# Patient Record
Sex: FEMALE | Race: WHITE | NOT HISPANIC OR LATINO
[De-identification: names, ages, dates, MRNs, and addresses within clinical notes are randomized per-mention and may not be internally consistent; named-entity substitution may affect disease eponyms.]

---

## 2019-03-11 ENCOUNTER — RESULT REVIEW (OUTPATIENT)
Age: 59
End: 2019-03-11

## 2019-03-14 PROBLEM — Z00.00 ENCOUNTER FOR PREVENTIVE HEALTH EXAMINATION: Status: ACTIVE | Noted: 2019-03-14

## 2019-03-18 ENCOUNTER — APPOINTMENT (OUTPATIENT)
Dept: UROGYNECOLOGY | Facility: CLINIC | Age: 59
End: 2019-03-18
Payer: COMMERCIAL

## 2019-03-18 VITALS
SYSTOLIC BLOOD PRESSURE: 168 MMHG | WEIGHT: 210 LBS | DIASTOLIC BLOOD PRESSURE: 94 MMHG | BODY MASS INDEX: 37.21 KG/M2 | HEIGHT: 63 IN

## 2019-03-18 DIAGNOSIS — Z83.42 FAMILY HISTORY OF FAMILIAL HYPERCHOLESTEROLEMIA: ICD-10-CM

## 2019-03-18 DIAGNOSIS — Z82.49 FAMILY HISTORY OF ISCHEMIC HEART DISEASE AND OTHER DISEASES OF THE CIRCULATORY SYSTEM: ICD-10-CM

## 2019-03-18 DIAGNOSIS — Z78.9 OTHER SPECIFIED HEALTH STATUS: ICD-10-CM

## 2019-03-18 DIAGNOSIS — R39.198 OTHER DIFFICULTIES WITH MICTURITION: ICD-10-CM

## 2019-03-18 DIAGNOSIS — N36.41 HYPERMOBILITY OF URETHRA: ICD-10-CM

## 2019-03-18 LAB
BILIRUB UR QL STRIP: NORMAL
CLARITY UR: CLEAR
COLLECTION METHOD: NORMAL
GLUCOSE UR-MCNC: NORMAL
HCG UR QL: 0.2 EU/DL
HGB UR QL STRIP.AUTO: NORMAL
KETONES UR-MCNC: NORMAL
LEUKOCYTE ESTERASE UR QL STRIP: NORMAL
NITRITE UR QL STRIP: NORMAL
PH UR STRIP: 6
PROT UR STRIP-MCNC: NORMAL
SP GR UR STRIP: 1.01

## 2019-03-18 PROCEDURE — 81003 URINALYSIS AUTO W/O SCOPE: CPT | Mod: QW

## 2019-03-18 PROCEDURE — 99204 OFFICE O/P NEW MOD 45 MIN: CPT | Mod: 25

## 2019-03-18 PROCEDURE — 51701 INSERT BLADDER CATHETER: CPT

## 2019-03-18 RX ORDER — AMOXICILLIN 875 MG/1
875 TABLET, FILM COATED ORAL
Qty: 10 | Refills: 0 | Status: DISCONTINUED | COMMUNITY
Start: 2019-01-29

## 2019-03-18 RX ORDER — ACETAMINOPHEN AND CODEINE 300; 30 MG/1; MG/1
300-30 TABLET ORAL
Qty: 4 | Refills: 0 | Status: DISCONTINUED | COMMUNITY
Start: 2019-01-29

## 2019-03-18 RX ORDER — IBUPROFEN 600 MG/1
600 TABLET, FILM COATED ORAL
Qty: 20 | Refills: 0 | Status: DISCONTINUED | COMMUNITY
Start: 2019-01-29

## 2019-03-18 RX ORDER — ATORVASTATIN CALCIUM 20 MG/1
20 TABLET, FILM COATED ORAL
Qty: 30 | Refills: 0 | Status: DISCONTINUED | COMMUNITY
Start: 2019-01-10

## 2019-03-18 RX ORDER — CHLORHEXIDINE GLUCONATE, 0.12% ORAL RINSE 1.2 MG/ML
0.12 SOLUTION DENTAL
Qty: 480 | Refills: 0 | Status: DISCONTINUED | COMMUNITY
Start: 2019-01-29

## 2019-03-18 NOTE — PHYSICAL EXAM
[No Acute Distress] : in no acute distress [Well developed] : well developed [Well Nourished] : ~L well nourished [Oriented x3] : oriented to person, place, and time [Bulbocavernous] : bulbocavernous was present [Anal Reflex] : the anal reflex was present [Scar] : a scar was noted [Suprapubic] : in the suprapubic area [Warm and Dry] : was warm and dry to touch [Normal Gait] : gait was normal [Vulvar Atrophy] : vulvar atrophy [Normal Appearance] : general appearance was normal [Atrophy] : atrophy [Aa ____] : Aa [unfilled] [Cystocele] : a cystocele [Ba ____] : Ba [unfilled] [C ____] : C [unfilled] [GH ____] : GH [unfilled] [PB ____] : PB [unfilled] [TVL ____] : TVL  [unfilled] [Bp ____] : Bp [unfilled] [Ap ____] : Ap [unfilled] [Normal] : normal [Uterine Adnexae] : were not tender and not enlarged [Absent] : absent [Adnexa Absent] : absent bilaterally [Post Void Residual ____ml] : post void residual via catheterization was [unfilled] ml [Normal rectal exam] : was normal [Tenderness] : ~T no ~M abdominal tenderness observed [Distended] : not distended [H/Smegaly] : no hepatosplenomegaly [Inguinal LAD] : no adenopathy was noted in the inguinal lymph nodes [FreeTextEntry4] : + vv/cervical prolapse [FreeTextEntry3] : + hypermobbility

## 2019-03-18 NOTE — HISTORY OF PRESENT ILLNESS
[Vaginal Wall Prolapse] : none [Rectal Prolapse] : none [Unable To Restrain Bowel Movement] : none [Urinary Frequency] : none [Feelings Of Urinary Urgency] : none [Urinary Tract Infection] : none [] : days ago [Incomplete Emptying Of Stool] : none [FreeTextEntry3] : sometimes [de-identified] : 14 [de-identified] : 14 [de-identified] : f [de-identified] : 0-1 [de-identified] : sometimes [de-identified] : 14 [de-identified] : + sexually active [FreeTextEntry1] : ROS as per questionnaire.\par LIOR due to fibroids also ovaries\par

## 2019-04-03 ENCOUNTER — OUTPATIENT (OUTPATIENT)
Dept: OUTPATIENT SERVICES | Facility: HOSPITAL | Age: 59
LOS: 1 days | End: 2019-04-03

## 2019-04-03 ENCOUNTER — APPOINTMENT (OUTPATIENT)
Dept: UROGYNECOLOGY | Facility: CLINIC | Age: 59
End: 2019-04-03
Payer: SELF-PAY

## 2019-04-03 PROCEDURE — 99214 OFFICE O/P EST MOD 30 MIN: CPT

## 2019-04-03 PROCEDURE — A4562: CPT

## 2019-04-18 ENCOUNTER — APPOINTMENT (OUTPATIENT)
Dept: UROGYNECOLOGY | Facility: CLINIC | Age: 59
End: 2019-04-18
Payer: COMMERCIAL

## 2019-04-18 PROCEDURE — A4562: CPT

## 2019-04-18 PROCEDURE — 99213 OFFICE O/P EST LOW 20 MIN: CPT

## 2019-04-18 NOTE — HISTORY OF PRESENT ILLNESS
[FreeTextEntry1] : Pt presents to office for f/u of prolapse.  Fitted with ring with support #5 pessary at last visit.  Reports cramping and pressure with long standing and walking.

## 2019-04-18 NOTE — PROCEDURE
[Good Fit] : fits well [Refit] : refit needed [Pessary Inserted] : inserted [H2O] : H2O [None] : no bleeding [Erythema] : no erythema [Erosion] : no evidence of erosion [Discharge] : no vaginal discharge [Infection] : no evidence of infection [FreeTextEntry1] : ring with support #5 -->#4 [de-identified] : to ring with support #4 [de-identified] : but due to pt's symptoms of pressure and tightness, pessary size was decreased [FreeTextEntry8] : routine ricardo-care

## 2019-04-18 NOTE — DISCUSSION/SUMMARY
[FreeTextEntry1] : Pt reports comfort with ring with support #4.  She will RTO in 2 weeks or sooner if needed. Pt agrees to call office with any problems/concerns.

## 2019-04-18 NOTE — PHYSICAL EXAM
[No Acute Distress] : in no acute distress [Well developed] : well developed [Well Nourished] : ~L well nourished [Good Hygeine] : demonstrates good hygeine [Oriented x3] : oriented to person, place, and time [Normal Mood/Affect] : mood and affect are normal [Normal Memory] : ~T memory was ~L unimpaired [Warm and Dry] : was warm and dry to touch [Normal Gait] : gait was normal [Labia Majora] : were normal [Normal] : was normal [Labia Minora] : were normal [Normal Appearance] : general appearance was normal [No Bleeding] : there was no active vaginal bleeding [Atrophy] : atrophy [Anxiety] : patient is not anxious [Tenderness] : ~T no ~M abdominal tenderness observed [Distended] : not distended

## 2019-05-02 ENCOUNTER — APPOINTMENT (OUTPATIENT)
Dept: UROGYNECOLOGY | Facility: CLINIC | Age: 59
End: 2019-05-02
Payer: COMMERCIAL

## 2019-05-02 PROCEDURE — 99213 OFFICE O/P EST LOW 20 MIN: CPT

## 2019-05-02 NOTE — HISTORY OF PRESENT ILLNESS
[FreeTextEntry1] : Pt presents to office for f/ of prolapse.  Decreased ring with support  from 5 to 4 due to pelvic cramping and pressure. She reports cramping and pressure has resolved with the smaller pessary.  She did have to advance pessary twice in the last 2 weeks after bowel movement.  Denied constipation. Denies that this is bothersome to her.  She would like to continue with this pessary.

## 2019-05-02 NOTE — PROCEDURE
[Good Fit] : fits well [Pessary Washed] : washed [H2O] : H2O [None] : no bleeding [Refit] : refit is not needed [Erosion] : no evidence of erosion [Discharge] : no vaginal discharge [Infection] : no evidence of infection [Erythema] : no erythema [FreeTextEntry1] : RS #4 [FreeTextEntry8] : routine ricardo-care [de-identified] : no prolapse seen past pessary

## 2019-05-02 NOTE — PHYSICAL EXAM
[No Acute Distress] : in no acute distress [Well developed] : well developed [Oriented x3] : oriented to person, place, and time [Good Hygeine] : demonstrates good hygeine [Well Nourished] : ~L well nourished [Normal Mood/Affect] : mood and affect are normal [Normal Memory] : ~T memory was ~L unimpaired [Warm and Dry] : was warm and dry to touch [Normal Gait] : gait was normal [Labia Minora] : were normal [Labia Majora] : were normal [Normal] : was normal [Normal Appearance] : general appearance was normal [Atrophy] : atrophy [No Bleeding] : there was no active vaginal bleeding [Anxiety] : patient is not anxious [Tenderness] : ~T no ~M abdominal tenderness observed [Distended] : not distended

## 2019-05-02 NOTE — DISCUSSION/SUMMARY
[FreeTextEntry1] : Pt happy with pessary. she will RTO in 3 months for f/u of prolapse.  She RTO sooner if pessary falls out.  She agrees to call office with any problems/concerns.

## 2019-06-27 ENCOUNTER — EMERGENCY (EMERGENCY)
Facility: HOSPITAL | Age: 59
LOS: 1 days | Discharge: HOME | End: 2019-06-27
Attending: EMERGENCY MEDICINE
Payer: COMMERCIAL

## 2019-06-27 VITALS
DIASTOLIC BLOOD PRESSURE: 74 MMHG | HEART RATE: 69 BPM | HEIGHT: 62 IN | WEIGHT: 20.06 LBS | SYSTOLIC BLOOD PRESSURE: 140 MMHG | TEMPERATURE: 98 F | RESPIRATION RATE: 16 BRPM | OXYGEN SATURATION: 99 %

## 2019-06-27 PROCEDURE — 99283 EMERGENCY DEPT VISIT LOW MDM: CPT

## 2019-06-27 RX ORDER — CEPHALEXIN 500 MG
1 CAPSULE ORAL
Qty: 40 | Refills: 0
Start: 2019-06-27 | End: 2019-07-06

## 2019-06-27 RX ORDER — AZTREONAM 2 G
1 VIAL (EA) INJECTION
Qty: 20 | Refills: 0
Start: 2019-06-27 | End: 2019-07-06

## 2019-06-27 NOTE — ED ADULT NURSE NOTE - OBJECTIVE STATEMENT
Pt is a 63 yo female with the co right arm redness that is becoming progressively worse over the past two days. Pt denies any fevers or drainage no CP or SOB no N/V/D no cough or chills. Pt reports leaving for [place] for 1 month on Saturday and she wanted to get it looked at prior to her trip. Pt denies any other complaints at this time.

## 2019-06-27 NOTE — ED ADULT NURSE NOTE - NSIMPLEMENTINTERV_GEN_ALL_ED
Implemented All Universal Safety Interventions:  [place] to call system. Call bell, personal items and telephone within reach. Instruct patient to call for assistance. Room bathroom lighting operational. Non-slip footwear when patient is off stretcher. Physically safe environment: no spills, clutter or unnecessary equipment. Stretcher in lowest position, wheels locked, appropriate side rails in place.

## 2019-06-27 NOTE — ED PROVIDER NOTE - CLINICAL SUMMARY MEDICAL DECISION MAKING FREE TEXT BOX
faint erythema and warmth at the area of a prior sebaceous cyst but no palpable abscess on exam, no exam findings suggestive of systemic bacterial infection or fasciitis. Will start treatment for cellulitis, plastics follow up, return precautions given. [place]: faint erythema and warmth at the area of a likely sebaceous cyst but no palpable abscess or tenderness on exam, no exam findings suggestive of systemic bacterial infection or fasciitis. Will start treatment for cellulitis, plastics follow up, return precautions given.

## 2019-06-27 NOTE — ED PROVIDER NOTE - SKIN, MLM
Skin normal color for race, warm, dry and intact. 2x3cm area of induration to the lateral right upper arm with faint surrounding erythema, no fluctuance, no tenderness, no discharge or open wound.

## 2019-06-27 NOTE — ED PROVIDER NOTE - OBJECTIVE STATEMENT
58yof w/ HTN has had a sebaceous cyst on the right upper arm for several years, noticed some increasing swelling for the past 2-3 days and this morning noticed mild redness and warmth. No fevers, chills or other systemic symptoms. No arm pain. No trauma or inciting event. No discharge.

## 2019-06-27 NOTE — ED PROVIDER NOTE - CARE PROVIDER_API CALL
Chilo Jerez (MD)  Plastic Surgery  [address] [place] [address], [address]  [place], [place] [id number]  Phone: [phone]  Fax: [phone]  Follow Up Time:

## 2019-07-07 ENCOUNTER — TRANSCRIPTION ENCOUNTER (OUTPATIENT)
Age: 59
End: 2019-07-07

## 2019-08-05 ENCOUNTER — APPOINTMENT (OUTPATIENT)
Dept: UROGYNECOLOGY | Facility: CLINIC | Age: 59
End: 2019-08-05
Payer: COMMERCIAL

## 2019-08-05 PROBLEM — I10 ESSENTIAL (PRIMARY) HYPERTENSION: Chronic | Status: ACTIVE | Noted: 2019-06-27

## 2019-08-05 PROBLEM — E78.5 HYPERLIPIDEMIA, UNSPECIFIED: Chronic | Status: ACTIVE | Noted: 2019-06-27

## 2019-08-05 PROCEDURE — 99213 OFFICE O/P EST LOW 20 MIN: CPT

## 2019-08-05 NOTE — DISCUSSION/SUMMARY
[FreeTextEntry1] : Pt happy with pessary. She will RTO in 3 months or sooner if needed. Pt agrees to call office with any problems/concerns.

## 2019-08-05 NOTE — PROCEDURE
[Good Fit] : fits well [Pessary Washed] : washed [H2O] : H2O [None] : no bleeding [Refit] : refit is not needed [Erosion] : no evidence of erosion [Erythema] : no erythema [Discharge] : no vaginal discharge [FreeTextEntry1] : Ring with support #4 [Infection] : no evidence of infection [FreeTextEntry8] : routine ricardo-care

## 2019-10-04 DIAGNOSIS — Z01.818 ENCOUNTER FOR OTHER PREPROCEDURAL EXAMINATION: ICD-10-CM

## 2019-11-06 ENCOUNTER — APPOINTMENT (OUTPATIENT)
Dept: UROGYNECOLOGY | Facility: CLINIC | Age: 59
End: 2019-11-06
Payer: COMMERCIAL

## 2019-11-06 PROCEDURE — 99213 OFFICE O/P EST LOW 20 MIN: CPT

## 2019-11-06 NOTE — HISTORY OF PRESENT ILLNESS
[FreeTextEntry1] : Pt happy with pessary.  Reports good support.  Denies any pelvic pain, pressure or vaginal bleeding.  Denies any problems with urination or BM.

## 2019-11-06 NOTE — DISCUSSION/SUMMARY
[FreeTextEntry1] : Pt to f/u in 3 months or sooner if needed.  States she is due for visit with GYN.  Discussed that she does still have her cervix and she should have PAP done.  She will f/u here 3 months after visit with GYN.  Instructed to call with any questions or concerns and she verbalizes understanding.\par

## 2019-11-06 NOTE — PHYSICAL EXAM
[No Acute Distress] : in no acute distress [Well developed] : well developed [Well Nourished] : ~L well nourished [Good Hygeine] : demonstrates good hygeine [Oriented x3] : oriented to person, place, and time [Normal Memory] : ~T memory was ~L unimpaired [Normal Mood/Affect] : mood and affect are normal [Soft, Nontender] : the abdomen was soft and nontender [Warm and Dry] : was warm and dry to touch [Normal Gait] : gait was normal [Normal Appearance] : general appearance was normal [Cystocele] : a cystocele [Discharge] : a  ~M vaginal discharge was present [Scant] : scant [Ciera] : yellow [No Bleeding] : there was no active vaginal bleeding [Normal] : normal

## 2019-11-06 NOTE — PROCEDURE
[Good Fit] : fits well [Discharge] : there is vaginal discharge [Pessary Inserted] : inserted [Pessary Washed] : washed [H2O] : H2O [None] : no bleeding [Erosion] : no evidence of erosion [Erythema] : no erythema [Infection] : no evidence of infection [FreeTextEntry1] : RS #4

## 2020-02-03 ENCOUNTER — APPOINTMENT (OUTPATIENT)
Dept: UROGYNECOLOGY | Facility: CLINIC | Age: 60
End: 2020-02-03
Payer: COMMERCIAL

## 2020-02-03 PROCEDURE — 99213 OFFICE O/P EST LOW 20 MIN: CPT

## 2020-02-03 NOTE — PHYSICAL EXAM
[No Acute Distress] : in no acute distress [Well developed] : well developed [Well Nourished] : ~L well nourished [Good Hygeine] : demonstrates good hygeine [Oriented x3] : oriented to person, place, and time [Normal Memory] : ~T memory was ~L unimpaired [Normal Mood/Affect] : mood and affect are normal [Soft, Nontender] : the abdomen was soft and nontender [Warm and Dry] : was warm and dry to touch [Normal Gait] : gait was normal [Normal Appearance] : general appearance was normal [Atrophy] : atrophy [Cystocele] : a cystocele [Discharge] : a  ~M vaginal discharge was present [Scant] : scant [White] : white [No Bleeding] : there was no active vaginal bleeding [Normal] : normal

## 2020-02-03 NOTE — HISTORY OF PRESENT ILLNESS
[FreeTextEntry1] : Pt very happy with pessary.  Reports good support.  Denies any pelvic pain, pressure or vaginal bleeding.  Denies any problems with urination or BM.  No other changes since last visit.

## 2020-06-01 ENCOUNTER — APPOINTMENT (OUTPATIENT)
Dept: UROGYNECOLOGY | Facility: CLINIC | Age: 60
End: 2020-06-01
Payer: COMMERCIAL

## 2020-06-01 PROCEDURE — 99213 OFFICE O/P EST LOW 20 MIN: CPT

## 2020-06-01 NOTE — DISCUSSION/SUMMARY
[FreeTextEntry1] : F/u 3 months or sooner if needed.  Discussed using replens/luvena 3x/week at night.  Instructed to call with any questions or concerns and she verbalizes understanding.\par

## 2020-06-01 NOTE — HISTORY OF PRESENT ILLNESS
[FreeTextEntry1] : Pt happy with pessary.  She denies any pelvic pain, pressure or vaginal bleeding.  Denies any problems with urination or BM.  She is happy with pessary but considering surgical correction. Denies any other changes since last visit.

## 2020-06-01 NOTE — PROCEDURE
[Good Fit] : fits well [Discharge] : there is vaginal discharge [Erythema] : erythema noted [Pessary Washed] : washed [Pessary Inserted] : inserted [Mild] : mild bleeding [H2O] : H2O [Resolved w/pressure] : was resolved by applying pressure [Erosion] : no evidence of erosion [Infection] : no evidence of infection [FreeTextEntry1] : RS #4

## 2020-06-01 NOTE — PHYSICAL EXAM
[No Acute Distress] : in no acute distress [Well developed] : well developed [Well Nourished] : ~L well nourished [Good Hygeine] : demonstrates good hygeine [Oriented x3] : oriented to person, place, and time [Normal Memory] : ~T memory was ~L unimpaired [Normal Mood/Affect] : mood and affect are normal [Soft, Nontender] : the abdomen was soft and nontender [Warm and Dry] : was warm and dry to touch [Normal Gait] : gait was normal [Atrophy] : atrophy [Cystocele] : a cystocele [Normal Appearance] : general appearance was normal [Discharge] : a  ~M vaginal discharge was present [Ciera] : yellow [Scant] : there was scant vaginal bleeding [Normal] : normal [FreeTextEntry4] : + irritations to posterior vaginal wall with scant bleeding

## 2020-07-17 NOTE — ED PROVIDER NOTE - NSFOLLOWUPINSTRUCTIONS_ED_ALL_ED_FT
Spoke with patient. Patient did not need medication. Was called in twice.    1. Continue cephalexin 500mg 4 times daily for 10 days  2. Continue Bactrim DS twice daily for 10 days  3. Take probiotic or yogurt with live-active culture while on antibiotics.   4. Follow up with plastic surgery for cyst removal  5. Return to the ER for any new or worsening symptoms.

## 2020-09-14 ENCOUNTER — APPOINTMENT (OUTPATIENT)
Dept: UROGYNECOLOGY | Facility: CLINIC | Age: 60
End: 2020-09-14
Payer: COMMERCIAL

## 2020-09-14 PROCEDURE — 99213 OFFICE O/P EST LOW 20 MIN: CPT

## 2020-09-14 NOTE — HISTORY OF PRESENT ILLNESS
[FreeTextEntry1] : Pt happy with pessary.  Reports good support.  Denies any pelvic pain, pressure or vaginal bleeding.  Denies any problems with urination or BM.  She started using replens TIW.

## 2020-09-14 NOTE — DISCUSSION/SUMMARY
[FreeTextEntry1] : F/u 3 months or sooner if needed.  Desirae replens TIW.  Instructed to call with any questions or concerns and she verbalizes understanding.\par

## 2020-09-14 NOTE — PHYSICAL EXAM
[No Acute Distress] : in no acute distress [Well Nourished] : ~L well nourished [Well developed] : well developed [Good Hygeine] : demonstrates good hygeine [Normal Memory] : ~T memory was ~L unimpaired [Oriented x3] : oriented to person, place, and time [Normal Mood/Affect] : mood and affect are normal [Soft, Nontender] : the abdomen was soft and nontender [Normal Gait] : gait was normal [Warm and Dry] : was warm and dry to touch [Atrophy] : atrophy [Normal Appearance] : general appearance was normal [Cystocele] : a cystocele [Discharge] : a  ~M vaginal discharge was present [Scant] : scant [White] : white [No Bleeding] : there was no active vaginal bleeding [Normal] : normal

## 2020-09-14 NOTE — PROCEDURE
[Good Fit] : fits well [Discharge] : there is vaginal discharge [Pessary Inserted] : inserted [Pessary Washed] : washed [H2O] : H2O [None] : no bleeding [Erythema] : no erythema [Erosion] : no evidence of erosion [Infection] : no evidence of infection [FreeTextEntry1] : RS #4

## 2020-12-15 ENCOUNTER — APPOINTMENT (OUTPATIENT)
Dept: UROGYNECOLOGY | Facility: CLINIC | Age: 60
End: 2020-12-15
Payer: COMMERCIAL

## 2020-12-15 VITALS — SYSTOLIC BLOOD PRESSURE: 130 MMHG | DIASTOLIC BLOOD PRESSURE: 88 MMHG | TEMPERATURE: 97.3 F

## 2020-12-15 PROCEDURE — 99213 OFFICE O/P EST LOW 20 MIN: CPT

## 2020-12-15 PROCEDURE — 99072 ADDL SUPL MATRL&STAF TM PHE: CPT

## 2020-12-15 NOTE — DISCUSSION/SUMMARY
[FreeTextEntry1] : Patient to follow up in 3 months or sooner if needed\par Continue with replens TIW\par Advised to use A&D ointment all around vagina to help with itching\par Patient agrees to call office with questions or concerns, patient verbalized understanding\par

## 2020-12-15 NOTE — HISTORY OF PRESENT ILLNESS
[FreeTextEntry1] : Patient presents for routine pessary maintenance. Patient states she is happy with the pessary. Denies urinary or bowel complaints. States she continues to use replens three times a week. Denies pelvic pain, pelvic pressure or vaginal bleeding. Patient added she has been feeling like her vagina is itchy. \par

## 2020-12-15 NOTE — PROCEDURE
[Good Fit] : fits well [Refit] : refit is not needed [Erosion] : no evidence of erosion [Erythema] : no erythema [Discharge] : there is vaginal discharge [Infection] : no evidence of infection [Pessary Inserted] : inserted [Pessary Washed] : washed [H2O] : H2O [None] : no bleeding [FreeTextEntry1] : RS #4 [de-identified] : mild white leukorrhea  [FreeTextEntry8] : maintain ricardo-care

## 2021-03-30 ENCOUNTER — APPOINTMENT (OUTPATIENT)
Dept: UROGYNECOLOGY | Facility: CLINIC | Age: 61
End: 2021-03-30
Payer: COMMERCIAL

## 2021-03-30 VITALS — BODY MASS INDEX: 34.55 KG/M2 | TEMPERATURE: 96.9 F | HEIGHT: 63 IN | WEIGHT: 195 LBS

## 2021-03-30 PROCEDURE — 99072 ADDL SUPL MATRL&STAF TM PHE: CPT

## 2021-03-30 PROCEDURE — 99213 OFFICE O/P EST LOW 20 MIN: CPT

## 2021-03-30 NOTE — PROCEDURE
[Good Fit] : fits well [Discharge] : there is vaginal discharge [Pessary Inserted] : inserted [Pessary Washed] : washed [H2O] : H2O [None] : no bleeding [Refit] : refit is not needed [Erosion] : no evidence of erosion [Erythema] : no erythema [Infection] : no evidence of infection [FreeTextEntry1] : RS #4 [FreeTextEntry8] : maintain ricardo-care [de-identified] : mild white leukorrhea

## 2021-03-30 NOTE — HISTORY OF PRESENT ILLNESS
[FreeTextEntry1] : Patient presents for routine pessary maintenance. Patient states she is happy with the pessary. Denies urinary or bowel complaints.Denies pelvic pain, pelvic pressure or vaginal bleeding. She is using Replens.

## 2021-06-23 ENCOUNTER — APPOINTMENT (OUTPATIENT)
Dept: UROGYNECOLOGY | Facility: CLINIC | Age: 61
End: 2021-06-23
Payer: COMMERCIAL

## 2021-06-23 PROCEDURE — 99213 OFFICE O/P EST LOW 20 MIN: CPT

## 2021-06-23 NOTE — PROCEDURE
[Good Fit] : fits well [Discharge] : there is vaginal discharge [Pessary Inserted] : inserted [Pessary Washed] : washed [H2O] : H2O [None] : no bleeding [Refit] : refit is not needed [Erosion] : no evidence of erosion [Erythema] : no erythema [Infection] : no evidence of infection [FreeTextEntry1] : Ring with support # 4. [de-identified] : mild white leukorrhea  [FreeTextEntry8] : maintain ricardo-care

## 2021-06-23 NOTE — PHYSICAL EXAM
[No Acute Distress] : in no acute distress [Well developed] : well developed [Well Nourished] : ~L well nourished [Good Hygeine] : demonstrates good hygeine [Oriented x3] : oriented to person, place, and time [Normal Mood/Affect] : mood and affect are normal [Normal Memory] : ~T memory was ~L unimpaired [Soft, Nontender] : the abdomen was soft and nontender [Warm and Dry] : was warm and dry to touch [Normal Gait] : gait was normal [Normal Appearance] : general appearance was normal [Atrophy] : atrophy [Cystocele] : a cystocele [Discharge] : a  ~M vaginal discharge was present [Scant] : scant [White] : white [No Bleeding] : there was no active vaginal bleeding [Normal] : normal

## 2021-06-23 NOTE — DISCUSSION/SUMMARY
[FreeTextEntry1] : Patient to follow up in 3 months or sooner if needed for pelvic prolapse.\par Continue with replens TIW\par Patient agrees to call office with questions or concerns, patient verbalized understanding\par

## 2021-09-28 ENCOUNTER — APPOINTMENT (OUTPATIENT)
Dept: UROGYNECOLOGY | Facility: CLINIC | Age: 61
End: 2021-09-28
Payer: COMMERCIAL

## 2021-09-28 PROCEDURE — 99213 OFFICE O/P EST LOW 20 MIN: CPT

## 2021-09-28 NOTE — PROCEDURE
[Good Fit] : fits well [Discharge] : there is vaginal discharge [Pessary Inserted] : inserted [Pessary Washed] : washed [H2O] : H2O [None] : no bleeding [Refit] : refit is not needed [Erosion] : no evidence of erosion [Erythema] : no erythema [Infection] : no evidence of infection [FreeTextEntry1] : Ring with support # 4. [de-identified] : mild white leukorrhea  [FreeTextEntry8] : maintain ricardo-care

## 2021-12-17 ENCOUNTER — APPOINTMENT (OUTPATIENT)
Dept: UROGYNECOLOGY | Facility: CLINIC | Age: 61
End: 2021-12-17
Payer: COMMERCIAL

## 2021-12-17 VITALS — TEMPERATURE: 97.3 F

## 2021-12-17 PROCEDURE — 99213 OFFICE O/P EST LOW 20 MIN: CPT

## 2021-12-17 NOTE — PHYSICAL EXAM
[No Acute Distress] : in no acute distress [Well developed] : well developed [Well Nourished] : ~L well nourished [Good Hygeine] : demonstrates good hygeine [Oriented x3] : oriented to person, place, and time [Normal Memory] : ~T memory was ~L unimpaired [Normal Mood/Affect] : mood and affect are normal [Soft, Nontender] : the abdomen was soft and nontender [Warm and Dry] : was warm and dry to touch [Normal Gait] : gait was normal [Normal Appearance] : general appearance was normal [Atrophy] : atrophy [Cystocele] : a cystocele [Discharge] : a  ~M vaginal discharge was present [Scant] : scant [White] : white [No Bleeding] : there was no active vaginal bleeding [Normal] : normal [Vulvar Atrophy] : vulvar atrophy [Labia Majora] : were normal [Labia Minora] : were normal

## 2021-12-17 NOTE — PROCEDURE
[Good Fit] : fits well [Discharge] : there is vaginal discharge [Pessary Inserted] : inserted [Pessary Washed] : washed [H2O] : H2O [None] : no bleeding [Refit] : refit is not needed [Erosion] : no evidence of erosion [Erythema] : no erythema [Infection] : no evidence of infection [FreeTextEntry1] : Ring with support # 4. [de-identified] : mild white leukorrhea  [FreeTextEntry8] : maintain ricardo-care

## 2021-12-17 NOTE — DISCUSSION/SUMMARY
[FreeTextEntry1] : 1. POP\par - Patient doing well with pessary\par - Pessary precautions and instructions reviewed\par - Advised to continue use of vaginal replens\par - Will RTO in 3 months for follow up or sooner if needed\par \par Patient agrees to call office with questions or concerns, patient verbalized understanding\par

## 2022-03-04 ENCOUNTER — APPOINTMENT (OUTPATIENT)
Dept: UROGYNECOLOGY | Facility: CLINIC | Age: 62
End: 2022-03-04
Payer: COMMERCIAL

## 2022-03-04 PROCEDURE — 99213 OFFICE O/P EST LOW 20 MIN: CPT

## 2022-03-04 NOTE — PHYSICAL EXAM
[No Acute Distress] : in no acute distress [Well developed] : well developed [Well Nourished] : ~L well nourished [Good Hygeine] : demonstrates good hygeine [Oriented x3] : oriented to person, place, and time [Normal Memory] : ~T memory was ~L unimpaired [Normal Mood/Affect] : mood and affect are normal [Soft, Nontender] : the abdomen was soft and nontender [Warm and Dry] : was warm and dry to touch [Normal Gait] : gait was normal [Vulvar Atrophy] : vulvar atrophy [Labia Majora] : were normal [Labia Minora] : were normal [Normal Appearance] : general appearance was normal [Atrophy] : atrophy [Cystocele] : a cystocele [Discharge] : a  ~M vaginal discharge was present [Scant] : scant [White] : white [No Bleeding] : there was no active vaginal bleeding [Normal] : normal

## 2022-03-08 NOTE — PROCEDURE
[Good Fit] : fits well [Discharge] : there is vaginal discharge [Pessary Inserted] : inserted [Pessary Washed] : washed [H2O] : H2O [None] : no bleeding [Refit] : refit is not needed [Erosion] : no evidence of erosion [Erythema] : no erythema [Infection] : no evidence of infection [FreeTextEntry1] : Ring with support # 4. [de-identified] : mild white leukorrhea  [FreeTextEntry8] : maintain ricardo-care

## 2022-03-08 NOTE — DISCUSSION/SUMMARY
[FreeTextEntry1] : 1. Pelvic prolapse:\par - Continue with Ring with support #4.  Patient doing well with pessary\par - Pessary precautions and instructions reviewed\par - Advised to continue use of vaginal replens\par \par - Will RTO in 3 months for follow up or sooner if needed\par Patient agrees to call office with questions or concerns, patient verbalized understanding\par

## 2022-03-08 NOTE — HISTORY OF PRESENT ILLNESS
[FreeTextEntry1] : Jory, 62y/o presents to the office for follow up pelvic prolapse, supported with ring # 4. Patient states she is happy with the pessary. Denies urinary or bowel complaints. Denies pelvic pain, pelvic pressure or vaginal bleeding. She is using Replens.

## 2022-05-27 ENCOUNTER — APPOINTMENT (OUTPATIENT)
Dept: UROGYNECOLOGY | Facility: CLINIC | Age: 62
End: 2022-05-27
Payer: COMMERCIAL

## 2022-05-27 DIAGNOSIS — N89.8 OTHER SPECIFIED NONINFLAMMATORY DISORDERS OF VAGINA: ICD-10-CM

## 2022-05-27 PROCEDURE — 99213 OFFICE O/P EST LOW 20 MIN: CPT

## 2022-05-27 NOTE — PROCEDURE
[Good Fit] : fits well [Discharge] : there is vaginal discharge [Pessary Inserted] : inserted [Pessary Washed] : washed [H2O] : H2O [None] : no bleeding [Refit] : refit is not needed [Erosion] : no evidence of erosion [Erythema] : no erythema [Infection] : no evidence of infection [FreeTextEntry1] : Ring with support # 4. [de-identified] : mild white leukorrhea  [FreeTextEntry8] : maintain ricardo-care

## 2022-08-26 ENCOUNTER — APPOINTMENT (OUTPATIENT)
Dept: UROGYNECOLOGY | Facility: CLINIC | Age: 62
End: 2022-08-26

## 2022-08-26 PROCEDURE — 99213 OFFICE O/P EST LOW 20 MIN: CPT

## 2022-08-26 NOTE — PROCEDURE
[Good Fit] : fits well [Pessary Inserted] : inserted [Pessary Washed] : washed [None] : no bleeding [Medication Review] : Medicaiton Review: Patient verbalizes understanding of risks and benefits [Refit] : refit is not needed [Erosion] : no evidence of erosion [Erythema] : no erythema [Discharge] : no vaginal discharge [Infection] : no evidence of infection [FreeTextEntry1] : Ring with support # 4. [FreeTextEntry3] : Replens [FreeTextEntry8] : Reviewed ricardo-care

## 2022-08-26 NOTE — HISTORY OF PRESENT ILLNESS
[FreeTextEntry1] : Jory, 60y/o presents to the office for follow up for her pelvic prolapse, supported with ring with support # 4. Patient states she is happy with the pessary. Denies urinary or bowel complaints. Denies pelvic pain, pelvic pressure or vaginal bleeding. She is using Replens.

## 2022-08-26 NOTE — PHYSICAL EXAM
[No Acute Distress] : in no acute distress [Well developed] : well developed [Well Nourished] : ~L well nourished [Good Hygeine] : demonstrates good hygeine [Oriented x3] : oriented to person, place, and time [Normal Memory] : ~T memory was ~L unimpaired [Normal Mood/Affect] : mood and affect are normal [Soft, Nontender] : the abdomen was soft and nontender [Warm and Dry] : was warm and dry to touch [Normal Gait] : gait was normal [Vulvar Atrophy] : vulvar atrophy [Labia Majora] : were normal [Labia Minora] : were normal [Normal Appearance] : general appearance was normal [Atrophy] : atrophy [Cystocele] : a cystocele [No Bleeding] : there was no active vaginal bleeding [Normal] : normal [Anxiety] : patient is not anxious

## 2022-09-20 ENCOUNTER — APPOINTMENT (OUTPATIENT)
Dept: OBGYN | Facility: CLINIC | Age: 62
End: 2022-09-20

## 2022-09-20 VITALS
BODY MASS INDEX: 40.04 KG/M2 | SYSTOLIC BLOOD PRESSURE: 143 MMHG | DIASTOLIC BLOOD PRESSURE: 83 MMHG | HEIGHT: 63 IN | WEIGHT: 226 LBS

## 2022-09-20 PROCEDURE — 99386 PREV VISIT NEW AGE 40-64: CPT

## 2022-09-20 NOTE — HISTORY OF PRESENT ILLNESS
[Currently Active] : currently active [Mammogramdate] : 8/22 [PapSmeardate] : 3/19 [BoneDensityDate] : 2022 [ColonoscopyDate] : >10YRS AGO;  COLOGARD DONE RECENTLY [PGHxTotal] : 2 [[facility] : [facility] [[facility] : 2 [FreeTextEntry1] : YELITZA

## 2022-09-20 NOTE — PHYSICAL EXAM
clnical update provided to laurel at [facility], approved additional week with update due 11/14  Met w/pt and reviewed team update and insurance info  Pt feels he is doing very well and is appreciative of the therapy  Pt is aware of goals for dc and need for update next week  Following to assist w/contd stay review and dc needs  [Appropriately responsive] : appropriately responsive [Alert] : alert [No Acute Distress] : no acute distress [No Lymphadenopathy] : no lymphadenopathy [Soft] : soft [Non-tender] : non-tender [Non-distended] : non-distended [No HSM] : No HSM [No Lesions] : no lesions [No Mass] : no mass [Oriented x3] : oriented x3 [Examination Of The Breasts] : a normal appearance [No Masses] : no breast masses were palpable [Labia Majora] : normal [Labia Minora] : normal [Cystocele] : a cystocele [Normal] : normal [Absent] : absent [Uterine Adnexae] : absent

## 2022-09-22 LAB — HPV HIGH+LOW RISK DNA PNL CVX: NOT DETECTED

## 2022-09-27 LAB — CYTOLOGY CVX/VAG DOC THIN PREP: NORMAL

## 2022-12-01 ENCOUNTER — NON-APPOINTMENT (OUTPATIENT)
Age: 62
End: 2022-12-01

## 2022-12-01 ENCOUNTER — APPOINTMENT (OUTPATIENT)
Dept: UROGYNECOLOGY | Facility: CLINIC | Age: 62
End: 2022-12-01

## 2022-12-01 VITALS
BODY MASS INDEX: 38.09 KG/M2 | HEART RATE: 71 BPM | SYSTOLIC BLOOD PRESSURE: 143 MMHG | DIASTOLIC BLOOD PRESSURE: 88 MMHG | HEIGHT: 63 IN | WEIGHT: 215 LBS | OXYGEN SATURATION: 99 %

## 2022-12-01 PROCEDURE — 99213 OFFICE O/P EST LOW 20 MIN: CPT

## 2022-12-01 NOTE — DISCUSSION/SUMMARY
[FreeTextEntry1] : Pelvic prolapse:\par - Continue with Ring with support #4.  Patient doing well with pessary\par - Pessary precautions and instructions reviewed\par - Advised to continue use of vaginal replens\par \par - Will RTO in 3 months for follow up or sooner if needed\par Patient agrees to call office with questions or concerns, patient verbalized understanding\par

## 2022-12-01 NOTE — PHYSICAL EXAM
[No Acute Distress] : in no acute distress [Well developed] : well developed [Well Nourished] : ~L well nourished [Good Hygeine] : demonstrates good hygeine [Oriented x3] : oriented to person, place, and time [Normal Memory] : ~T memory was ~L unimpaired [Normal Mood/Affect] : mood and affect are normal [Anxiety] : patient is not anxious [Soft, Nontender] : the abdomen was soft and nontender [Warm and Dry] : was warm and dry to touch [Normal Gait] : gait was normal [Vulvar Atrophy] : vulvar atrophy [Labia Majora] : were normal [Labia Minora] : were normal [Normal Appearance] : general appearance was normal [Atrophy] : atrophy [Cystocele] : a cystocele [No Bleeding] : there was no active vaginal bleeding [Normal] : normal

## 2022-12-01 NOTE — PROCEDURE
[Good Fit] : fits well [Refit] : refit is not needed [Erosion] : no evidence of erosion [Erythema] : no erythema [Discharge] : no vaginal discharge [Infection] : no evidence of infection [Pessary Inserted] : inserted [Pessary Washed] : washed [None] : no bleeding [Medication Review] : Medicaiton Review: Patient verbalizes understanding of risks and benefits [FreeTextEntry1] : Ring with support # 4. [FreeTextEntry3] : Replens [FreeTextEntry8] : Reviewed [facility]

## 2022-12-01 NOTE — HISTORY OF PRESENT ILLNESS
[FreeTextEntry1] : Jory, 61 y/o presents to the office for follow up for her pelvic prolapse, supported with ring with support # 4. Patient states she is happy with the pessary. Denies urinary or bowel complaints. Denies pelvic pain, pelvic pressure or vaginal bleeding. She is using Replens.

## 2023-03-02 ENCOUNTER — APPOINTMENT (OUTPATIENT)
Dept: UROGYNECOLOGY | Facility: CLINIC | Age: 63
End: 2023-03-02
Payer: COMMERCIAL

## 2023-03-02 VITALS
HEIGHT: 62 IN | SYSTOLIC BLOOD PRESSURE: 142 MMHG | OXYGEN SATURATION: 100 % | WEIGHT: 217 LBS | HEART RATE: 67 BPM | BODY MASS INDEX: 39.93 KG/M2 | DIASTOLIC BLOOD PRESSURE: 87 MMHG

## 2023-03-02 PROCEDURE — 99213 OFFICE O/P EST LOW 20 MIN: CPT

## 2023-04-07 NOTE — REASON FOR VISIT
Insurance will not cover CPAP titration   They recommend APAP  Okay to place order for APAP for patient or pulmonary consult for patient?   If okay to order would need minimum/maximum cmH20 settings   [Follow-Up Visit_____] : a follow-up visit for [unfilled]

## 2023-06-01 ENCOUNTER — APPOINTMENT (OUTPATIENT)
Dept: UROGYNECOLOGY | Facility: CLINIC | Age: 63
End: 2023-06-01
Payer: COMMERCIAL

## 2023-06-01 VITALS
OXYGEN SATURATION: 99 % | HEART RATE: 62 BPM | HEIGHT: 62 IN | SYSTOLIC BLOOD PRESSURE: 114 MMHG | DIASTOLIC BLOOD PRESSURE: 73 MMHG | WEIGHT: 215 LBS | BODY MASS INDEX: 39.56 KG/M2

## 2023-06-01 DIAGNOSIS — N81.2 INCOMPLETE UTEROVAGINAL PROLAPSE: ICD-10-CM

## 2023-06-01 PROCEDURE — 99213 OFFICE O/P EST LOW 20 MIN: CPT

## 2023-06-01 NOTE — REVIEW OF SYSTEMS
Pt called with questions about her scheduled C/S. Confirmed date of 05-06-20 at 1300. Pt instructed time is approximate. Pt had questions about Covid testing and hospital guidelines. Transferred to [facility] HUGHES to answer patient's questions.   [All Other ROS] : all other reviewed systems are negative

## 2023-06-01 NOTE — PROCEDURE
[Good Fit] : fits well [Refit] : refit is not needed [Erosion] : no evidence of erosion [Erythema] : no erythema [Discharge] : there is vaginal discharge [Infection] : no evidence of infection [Pessary Inserted] : inserted [Pessary Washed] : washed [None] : no bleeding [Medication Review] : Medicaiton Review: Patient verbalizes understanding of risks and benefits [FreeTextEntry1] : Ring with support # 4. [FreeTextEntry3] : Replens [FreeTextEntry8] : Reviewed [facility]

## 2023-06-08 NOTE — REVIEW OF SYSTEMS
Shingles Vaccine(1 of 2) Never done- declined COVID-19 Vaccine(3 - Moderna series) due on 08/20/2021- declined TETANUS VACCINE due on 10/11/2021-declined
[All Other ROS] : all other reviewed systems are negative

## 2023-08-31 ENCOUNTER — APPOINTMENT (OUTPATIENT)
Dept: UROGYNECOLOGY | Facility: CLINIC | Age: 63
End: 2023-08-31
Payer: COMMERCIAL

## 2023-08-31 VITALS
HEIGHT: 62 IN | BODY MASS INDEX: 39.56 KG/M2 | DIASTOLIC BLOOD PRESSURE: 70 MMHG | SYSTOLIC BLOOD PRESSURE: 116 MMHG | WEIGHT: 215 LBS

## 2023-08-31 PROCEDURE — 99213 OFFICE O/P EST LOW 20 MIN: CPT

## 2023-08-31 NOTE — HISTORY OF PRESENT ILLNESS
[FreeTextEntry1] : Jory, 63 y/o presents to the office for follow up for her pelvic prolapse, supported with ring with support # 4. Patient states she is happy with the pessary. Denies urinary or bowel complaints. Denies pelvic pain, pelvic pressure or vaginal bleeding. She is using Replens.
22:05

## 2023-09-21 ENCOUNTER — APPOINTMENT (OUTPATIENT)
Dept: OBGYN | Facility: CLINIC | Age: 63
End: 2023-09-21
Payer: COMMERCIAL

## 2023-09-21 VITALS
DIASTOLIC BLOOD PRESSURE: 81 MMHG | BODY MASS INDEX: 39.56 KG/M2 | SYSTOLIC BLOOD PRESSURE: 148 MMHG | HEIGHT: 62 IN | WEIGHT: 215 LBS

## 2023-09-21 DIAGNOSIS — B37.9 CANDIDIASIS, UNSPECIFIED: ICD-10-CM

## 2023-09-21 DIAGNOSIS — Z01.419 ENCOUNTER FOR GYNECOLOGICAL EXAMINATION (GENERAL) (ROUTINE) W/OUT ABNORMAL FINDINGS: ICD-10-CM

## 2023-09-21 PROCEDURE — 99396 PREV VISIT EST AGE 40-64: CPT

## 2023-09-21 RX ORDER — NYSTATIN 100000 1/G
100000 POWDER TOPICAL 3 TIMES DAILY
Qty: 1 | Refills: 2 | Status: ACTIVE | COMMUNITY
Start: 2023-09-21 | End: 1900-01-01

## 2024-01-04 ENCOUNTER — APPOINTMENT (OUTPATIENT)
Dept: UROGYNECOLOGY | Facility: CLINIC | Age: 64
End: 2024-01-04
Payer: MEDICARE

## 2024-01-04 VITALS
BODY MASS INDEX: 38.98 KG/M2 | HEIGHT: 63 IN | WEIGHT: 220 LBS | DIASTOLIC BLOOD PRESSURE: 90 MMHG | HEART RATE: 71 BPM | SYSTOLIC BLOOD PRESSURE: 144 MMHG

## 2024-01-04 DIAGNOSIS — N99.3 PROLAPSE OF VAGINAL VAULT AFTER HYSTERECTOMY: ICD-10-CM

## 2024-01-04 DIAGNOSIS — N95.2 POSTMENOPAUSAL ATROPHIC VAGINITIS: ICD-10-CM

## 2024-01-04 PROCEDURE — 99213 OFFICE O/P EST LOW 20 MIN: CPT

## 2024-01-04 NOTE — HISTORY OF PRESENT ILLNESS
[FreeTextEntry1] : Jory, 64 y/o presents to the office for follow up for her pelvic prolapse, supported with ring with support # 4. Patient states she is happy with the pessary. Denies urinary or bowel complaints. Denies pelvic pain, pelvic pressure or vaginal bleeding. She is using Replens.

## 2024-03-28 ENCOUNTER — NON-APPOINTMENT (OUTPATIENT)
Age: 64
End: 2024-03-28

## 2024-03-28 ENCOUNTER — APPOINTMENT (OUTPATIENT)
Dept: CARDIOLOGY | Facility: CLINIC | Age: 64
End: 2024-03-28
Payer: MEDICARE

## 2024-03-28 VITALS — SYSTOLIC BLOOD PRESSURE: 124 MMHG | DIASTOLIC BLOOD PRESSURE: 78 MMHG

## 2024-03-28 VITALS
WEIGHT: 220 LBS | HEART RATE: 72 BPM | BODY MASS INDEX: 38.98 KG/M2 | HEIGHT: 63 IN | OXYGEN SATURATION: 100 % | RESPIRATION RATE: 12 BRPM | DIASTOLIC BLOOD PRESSURE: 84 MMHG | SYSTOLIC BLOOD PRESSURE: 145 MMHG

## 2024-03-28 DIAGNOSIS — I10 ESSENTIAL (PRIMARY) HYPERTENSION: ICD-10-CM

## 2024-03-28 DIAGNOSIS — E78.00 PURE HYPERCHOLESTEROLEMIA, UNSPECIFIED: ICD-10-CM

## 2024-03-28 PROCEDURE — 99204 OFFICE O/P NEW MOD 45 MIN: CPT | Mod: 25

## 2024-03-28 PROCEDURE — 93000 ELECTROCARDIOGRAM COMPLETE: CPT

## 2024-03-28 PROCEDURE — 93880 EXTRACRANIAL BILAT STUDY: CPT

## 2024-03-28 RX ORDER — ATORVASTATIN CALCIUM 10 MG/1
10 TABLET, FILM COATED ORAL
Qty: 1 | Refills: 1 | Status: ACTIVE | COMMUNITY
Start: 1900-01-01 | End: 1900-01-01

## 2024-03-28 RX ORDER — OLMESARTAN MEDOXOMIL 20 MG/1
20 TABLET, FILM COATED ORAL
Qty: 90 | Refills: 1 | Status: ACTIVE | COMMUNITY
Start: 1900-01-01 | End: 1900-01-01

## 2024-03-28 RX ORDER — AMLODIPINE BESYLATE 2.5 MG/1
2.5 TABLET ORAL
Qty: 180 | Refills: 1 | Status: ACTIVE | COMMUNITY
Start: 2024-03-28 | End: 1900-01-01

## 2024-03-28 RX ORDER — ATENOLOL 50 MG/1
50 TABLET ORAL
Qty: 90 | Refills: 1 | Status: ACTIVE | COMMUNITY
Start: 1900-01-01 | End: 1900-01-01

## 2024-03-30 NOTE — HISTORY OF PRESENT ILLNESS
[FreeTextEntry1] : Patient is a 63 year old woman with a history of HTN, hyperlipidemia, and family history of CAD and valvular heart disease who presents today for evaluation of HTN. She states that two weeks ago she had dizziness and her blood pressure was 168/100. At that time, she increased the dose of her Amlodipine to 5 mg daily. She has continued on Atenolol 50 mg daily and Benicar 20 mg daily. She states that since that time her blood pressure has been well controlled. She has also continued on Atorvastatin 10 mg daily. She otherwise denies any chest pain, dyspnea at rest, palpitations, and headaches. She states that she experiences dyspnea with exertion, which she feels is secondary to deconditioning.

## 2024-03-30 NOTE — PHYSICAL EXAM
[Well Developed] : well developed [No Acute Distress] : no acute distress [Well Nourished] : well nourished [Normal Venous Pressure] : normal venous pressure [No Carotid Bruit] : no carotid bruit [Normal Rate] : normal [Rhythm Regular] : regular [Normal S2] : normal S2 [Normal S1] : normal S1 [No Pitting Edema] : no pitting edema present [No Murmur] : no murmurs heard [S3] : no S3 [Left Carotid Bruit] : no bruit heard over the left carotid [Right Carotid Bruit] : no bruit heard over the right carotid [Clear Lung Fields] : clear lung fields [Bruit] : no bruit heard [No Respiratory Distress] : no respiratory distress  [Soft] : abdomen soft [Good Air Entry] : good air entry [Non Tender] : non-tender [Normal Bowel Sounds] : normal bowel sounds [Normal Gait] : normal gait [Gait - Sufficient for Exercise Testing] : gait - sufficient for exercise testing [No Edema] : no edema [No Focal Deficits] : no focal deficits [No Cyanosis] : no cyanosis [Moves all extremities] : moves all extremities [Alert and Oriented] : alert and oriented [Normal Speech] : normal speech [Normal memory] : normal memory [de-identified] : Extraocular muscles intact. Anicteric sclerae. [de-identified] : No visible skin ulcers.   [de-identified] : No oral pallor.

## 2024-03-30 NOTE — REVIEW OF SYSTEMS
[Chest Discomfort] : no chest discomfort [Dyspnea on exertion] : dyspnea during exertion [Dizziness] : dizziness [Negative] : Heme/Lymph

## 2024-03-30 NOTE — DISCUSSION/SUMMARY
[EKG obtained to assist in diagnosis and management of assessed problem(s)] : EKG obtained to assist in diagnosis and management of assessed problem(s) [FreeTextEntry1] : IMPRESSION: Ms. GUERRERO is a 63 year old woman with a history of HTN, hyperlipidemia, and family history of CAD and valvular heart disease who presents today for evaluation of HTN. She states that two weeks ago she had dizziness and her blood pressure was 168/100.  PLAN: 1. Her blood pressure is good today. She will continue on Amlodipine 2.5 mg twice daily, Atenolol 50 mg daily, and Benicar 20 mg daily. We discussed the importance of an exercise regimen as well as diet modification and weight loss.  2. She will continue on Atorvastatin 10 mg daily and she will get me a copy of her most recent cholesterol results.  3. She will schedule an echocardiogram and an ultrasound of the abdominal aorta given her HTN.  4. Her dizziness was likely secondary to her elevated blood pressure. She will schedule a carotid Doppler and understands the importance of staying well hydrated.  5. She was in sinus rhythm with poor R wave progression on her ECG that was performed today. We discussed an exercise stress test given her ECG findings and her HTN, which we will revisit at the time of her next visit. 6. She will follow up with me in 4 months or sooner should she experience any symptoms in the interim.  7. I spent 45 minutes with the patient and her daughter during this encounter, which included the time spent on documentation and medication renewal.

## 2024-03-30 NOTE — CARDIOLOGY SUMMARY
[de-identified] : 3/28/2024: Sinus Rhythm at 75 beats per minute with poor r wave progression and low voltage QRS.

## 2024-04-03 ENCOUNTER — APPOINTMENT (OUTPATIENT)
Dept: CARDIOLOGY | Facility: CLINIC | Age: 64
End: 2024-04-03
Payer: MEDICARE

## 2024-04-03 PROCEDURE — 93306 TTE W/DOPPLER COMPLETE: CPT

## 2024-04-04 ENCOUNTER — APPOINTMENT (OUTPATIENT)
Dept: UROGYNECOLOGY | Facility: CLINIC | Age: 64
End: 2024-04-04
Payer: MEDICARE

## 2024-04-04 DIAGNOSIS — N81.11 CYSTOCELE, MIDLINE: ICD-10-CM

## 2024-04-04 PROCEDURE — 99213 OFFICE O/P EST LOW 20 MIN: CPT

## 2024-04-04 RX ORDER — NYSTATIN AND TRIAMCINOLONE ACETONIDE 100000; 1 [USP'U]/G; MG/G
100000-0.1 OINTMENT TOPICAL TWICE DAILY
Qty: 1 | Refills: 1 | Status: ACTIVE | COMMUNITY
Start: 2023-09-21 | End: 1900-01-01

## 2024-04-11 NOTE — HISTORY OF PRESENT ILLNESS
[FreeTextEntry1] : Jory is a 64 y/o presents to the office for follow up for her pelvic prolapse, supported with ring with support # 4. Patient states she is happy with the pessary. Denies urinary or bowel complaints. Denies pelvic pain, pelvic pressure or vaginal bleeding. She is using Replens.

## 2024-04-11 NOTE — PROCEDURE
[Good Fit] : fits well [Discharge] : there is vaginal discharge [Pessary Inserted] : inserted [Pessary Washed] : washed [None] : no bleeding [Medication Review] : Medicaiton Review: Patient verbalizes understanding of risks and benefits [Refit] : refit is not needed [Erosion] : no evidence of erosion [Erythema] : no erythema [Infection] : no evidence of infection [FreeTextEntry1] : Ring with support # 4. [FreeTextEntry3] : Replens [FreeTextEntry8] : Reviewed [facility]

## 2024-04-12 ENCOUNTER — APPOINTMENT (OUTPATIENT)
Dept: CARDIOLOGY | Facility: CLINIC | Age: 64
End: 2024-04-12
Payer: MEDICARE

## 2024-04-12 PROCEDURE — 93978 VASCULAR STUDY: CPT

## 2024-07-11 ENCOUNTER — APPOINTMENT (OUTPATIENT)
Dept: UROGYNECOLOGY | Facility: CLINIC | Age: 64
End: 2024-07-11

## 2024-07-11 DIAGNOSIS — N81.2 INCOMPLETE UTEROVAGINAL PROLAPSE: ICD-10-CM

## 2024-07-11 PROCEDURE — 99213 OFFICE O/P EST LOW 20 MIN: CPT

## 2024-07-11 PROCEDURE — G2211 COMPLEX E/M VISIT ADD ON: CPT

## 2024-08-07 ENCOUNTER — APPOINTMENT (OUTPATIENT)
Dept: CARDIOLOGY | Facility: CLINIC | Age: 64
End: 2024-08-07

## 2024-08-07 ENCOUNTER — NON-APPOINTMENT (OUTPATIENT)
Age: 64
End: 2024-08-07

## 2024-08-07 PROCEDURE — 99214 OFFICE O/P EST MOD 30 MIN: CPT

## 2024-08-07 PROCEDURE — G2211 COMPLEX E/M VISIT ADD ON: CPT

## 2024-08-07 PROCEDURE — 93000 ELECTROCARDIOGRAM COMPLETE: CPT

## 2024-08-12 NOTE — PHYSICAL EXAM
[Well Developed] : well developed [Well Nourished] : well nourished [No Acute Distress] : no acute distress [Normal Venous Pressure] : normal venous pressure [No Carotid Bruit] : no carotid bruit [Normal Rate] : normal [Rhythm Regular] : regular [Normal S1] : normal S1 [Normal S2] : normal S2 [No Murmur] : no murmurs heard [No Pitting Edema] : no pitting edema present [Clear Lung Fields] : clear lung fields [Good Air Entry] : good air entry [No Respiratory Distress] : no respiratory distress  [Soft] : abdomen soft [Non Tender] : non-tender [Normal Bowel Sounds] : normal bowel sounds [Normal Gait] : normal gait [Gait - Sufficient for Exercise Testing] : gait - sufficient for exercise testing [No Edema] : no edema [No Cyanosis] : no cyanosis [Moves all extremities] : moves all extremities [No Focal Deficits] : no focal deficits [Normal Speech] : normal speech [Alert and Oriented] : alert and oriented [Normal memory] : normal memory [S3] : no S3 [Right Carotid Bruit] : no bruit heard over the right carotid [Left Carotid Bruit] : no bruit heard over the left carotid [Bruit] : no bruit heard [de-identified] : No oral pallor.  [de-identified] : Extraocular muscles intact. Anicteric sclerae. [de-identified] : No visible skin ulcers.

## 2024-08-12 NOTE — CARDIOLOGY SUMMARY
[de-identified] : 8/7/2024: Sinus Rhythm at 77 beats per minute with poor R wave progression, and low voltage QRS. [de-identified] : 4/3/2024: Grossly preserved left ventricular ejection fraction. EF is approximately 65%. There is mild (grade 1) left ventricular diastolic dysfunction. Normal right ventricular cavity size and normal systolic function. The left atrium is mildly dilated. Moderate mitral regurgitation. Mild mitral annular calcification with thickened and calcified mitral valve leaflets with decreased opening. Mild mitral valve stenosis. Calcified trileaflet aortic valve with mildly decreased opening. The aortic valve area is approximately 1.95 sqcm, by the continuity equation, which is consistent with mild aortic stenosis. Trace aortic regurgitation. No echocardiographic evidence of pulmonary hypertension. Mild tricuspid regurgitation. PASP= 33 mmHg. Trace pericardial effusion. [de-identified] : 4/12/2024: US Aortic Aneurysm Screening 1. The maximal diameter of the aorta was 2.30 cm x 2.30 cm at the proximal segment. 2. There is no evidence of abdominal aortic aneurysm.  3/28/2024: Carotid Doppler 1. Mild atherosclerotic plaque noted in bilateral internal carotid arteries (20-49%) 2. No hemodynamically significant stenosis.

## 2024-08-12 NOTE — PHYSICAL EXAM
[Well Developed] : well developed [Well Nourished] : well nourished [No Acute Distress] : no acute distress [Normal Venous Pressure] : normal venous pressure [No Carotid Bruit] : no carotid bruit [Normal Rate] : normal [Rhythm Regular] : regular [Normal S1] : normal S1 [Normal S2] : normal S2 [No Murmur] : no murmurs heard [No Pitting Edema] : no pitting edema present [Clear Lung Fields] : clear lung fields [Good Air Entry] : good air entry [No Respiratory Distress] : no respiratory distress  [Soft] : abdomen soft [Non Tender] : non-tender [Normal Bowel Sounds] : normal bowel sounds [Normal Gait] : normal gait [Gait - Sufficient for Exercise Testing] : gait - sufficient for exercise testing [No Edema] : no edema [No Cyanosis] : no cyanosis [Moves all extremities] : moves all extremities [No Focal Deficits] : no focal deficits [Normal Speech] : normal speech [Alert and Oriented] : alert and oriented [Normal memory] : normal memory [S3] : no S3 [Right Carotid Bruit] : no bruit heard over the right carotid [Left Carotid Bruit] : no bruit heard over the left carotid [Bruit] : no bruit heard [de-identified] : No oral pallor.  [de-identified] : Extraocular muscles intact. Anicteric sclerae. [de-identified] : No visible skin ulcers.

## 2024-08-12 NOTE — DISCUSSION/SUMMARY
[EKG obtained to assist in diagnosis and management of assessed problem(s)] : EKG obtained to assist in diagnosis and management of assessed problem(s) [FreeTextEntry1] : IMPRESSION: Mrs. GUERRERO is a 63 year old woman with a history of HTN, hyperlipidemia, and family history of CAD, and mitral regurgitation who presents today for followup of HTN.   PLAN: 1. Her blood pressure is fine today. She will continue on Amlodipine 2.5 mg twice daily, Atenolol 50 mg daily, and Benicar 20 mg daily. We discussed the importance of an exercise regimen as well as diet modification and weight loss.  2. She will continue on Atorvastatin 10 mg daily and she will get me a copy of her most recent cholesterol results.  3. She had an echocardiogram 4 months ago that revealed moderate mitral regurgitation. She should have a repeat echocardiogram in one year to follow up her mitral regurgitation.  4. She was in sinus rhythm with poor R wave progression on her ECG that was performed today. We discussed an exercise stress test given her ECG findings and her HTN, which we will revisit at the time of her next visit as she does not want to have a stress test at this time. 5. She will follow up with me in 6 months or sooner should she experience any symptoms in the interim.  6. I spent 32 minutes of face to face time with the patient during this encounter, including documentation.

## 2024-08-12 NOTE — HISTORY OF PRESENT ILLNESS
[FreeTextEntry1] : Patient is a 63 year old woman with a history of HTN, hyperlipidemia, and family history of CAD and valvular heart disease who presents today for follow up of HTN. She has been taking Amlodipine 5 mg daily, Atenolol 50 mg daily, and Benicar 20 mg daily. She has also continued on Atorvastatin 10 mg daily. She otherwise denies any chest pain, dyspnea at rest, palpitations, and headaches. She states that she experiences dyspnea with exertion, which she feels is secondary to deconditioning.

## 2024-08-12 NOTE — PHYSICAL EXAM
[Well Developed] : well developed [Well Nourished] : well nourished [No Acute Distress] : no acute distress [Normal Venous Pressure] : normal venous pressure [No Carotid Bruit] : no carotid bruit [Normal Rate] : normal [Rhythm Regular] : regular [Normal S1] : normal S1 [Normal S2] : normal S2 [No Murmur] : no murmurs heard [No Pitting Edema] : no pitting edema present [Clear Lung Fields] : clear lung fields [Good Air Entry] : good air entry [No Respiratory Distress] : no respiratory distress  [Soft] : abdomen soft [Non Tender] : non-tender [Normal Bowel Sounds] : normal bowel sounds [Normal Gait] : normal gait [Gait - Sufficient for Exercise Testing] : gait - sufficient for exercise testing [No Edema] : no edema [No Cyanosis] : no cyanosis [Moves all extremities] : moves all extremities [No Focal Deficits] : no focal deficits [Normal Speech] : normal speech [Alert and Oriented] : alert and oriented [Normal memory] : normal memory [S3] : no S3 [Right Carotid Bruit] : no bruit heard over the right carotid [Left Carotid Bruit] : no bruit heard over the left carotid [Bruit] : no bruit heard [de-identified] : No oral pallor.  [de-identified] : Extraocular muscles intact. Anicteric sclerae. [de-identified] : No visible skin ulcers.

## 2024-08-12 NOTE — CARDIOLOGY SUMMARY
[de-identified] : 8/7/2024: Sinus Rhythm at 77 beats per minute with poor R wave progression, and low voltage QRS. [de-identified] : 4/3/2024: Grossly preserved left ventricular ejection fraction. EF is approximately 65%. There is mild (grade 1) left ventricular diastolic dysfunction. Normal right ventricular cavity size and normal systolic function. The left atrium is mildly dilated. Moderate mitral regurgitation. Mild mitral annular calcification with thickened and calcified mitral valve leaflets with decreased opening. Mild mitral valve stenosis. Calcified trileaflet aortic valve with mildly decreased opening. The aortic valve area is approximately 1.95 sqcm, by the continuity equation, which is consistent with mild aortic stenosis. Trace aortic regurgitation. No echocardiographic evidence of pulmonary hypertension. Mild tricuspid regurgitation. PASP= 33 mmHg. Trace pericardial effusion. [de-identified] : 4/12/2024: US Aortic Aneurysm Screening 1. The maximal diameter of the aorta was 2.30 cm x 2.30 cm at the proximal segment. 2. There is no evidence of abdominal aortic aneurysm.  3/28/2024: Carotid Doppler 1. Mild atherosclerotic plaque noted in bilateral internal carotid arteries (20-49%) 2. No hemodynamically significant stenosis.

## 2024-08-12 NOTE — CARDIOLOGY SUMMARY
[de-identified] : 8/7/2024: Sinus Rhythm at 77 beats per minute with poor R wave progression, and low voltage QRS. [de-identified] : 4/3/2024: Grossly preserved left ventricular ejection fraction. EF is approximately 65%. There is mild (grade 1) left ventricular diastolic dysfunction. Normal right ventricular cavity size and normal systolic function. The left atrium is mildly dilated. Moderate mitral regurgitation. Mild mitral annular calcification with thickened and calcified mitral valve leaflets with decreased opening. Mild mitral valve stenosis. Calcified trileaflet aortic valve with mildly decreased opening. The aortic valve area is approximately 1.95 sqcm, by the continuity equation, which is consistent with mild aortic stenosis. Trace aortic regurgitation. No echocardiographic evidence of pulmonary hypertension. Mild tricuspid regurgitation. PASP= 33 mmHg. Trace pericardial effusion. [de-identified] : 4/12/2024: US Aortic Aneurysm Screening 1. The maximal diameter of the aorta was 2.30 cm x 2.30 cm at the proximal segment. 2. There is no evidence of abdominal aortic aneurysm.  3/28/2024: Carotid Doppler 1. Mild atherosclerotic plaque noted in bilateral internal carotid arteries (20-49%) 2. No hemodynamically significant stenosis.

## 2024-10-17 ENCOUNTER — APPOINTMENT (OUTPATIENT)
Dept: UROGYNECOLOGY | Facility: CLINIC | Age: 64
End: 2024-10-17
Payer: MEDICARE

## 2024-10-17 VITALS
HEIGHT: 63 IN | BODY MASS INDEX: 38.98 KG/M2 | WEIGHT: 220 LBS | HEART RATE: 79 BPM | DIASTOLIC BLOOD PRESSURE: 80 MMHG | SYSTOLIC BLOOD PRESSURE: 127 MMHG

## 2024-10-17 DIAGNOSIS — N81.11 CYSTOCELE, MIDLINE: ICD-10-CM

## 2024-10-17 PROCEDURE — 99459 PELVIC EXAMINATION: CPT

## 2024-10-17 PROCEDURE — G2211 COMPLEX E/M VISIT ADD ON: CPT

## 2024-10-17 PROCEDURE — 99213 OFFICE O/P EST LOW 20 MIN: CPT

## 2025-01-03 ENCOUNTER — RX RENEWAL (OUTPATIENT)
Age: 65
End: 2025-01-03

## 2025-01-16 ENCOUNTER — APPOINTMENT (OUTPATIENT)
Dept: UROGYNECOLOGY | Facility: CLINIC | Age: 65
End: 2025-01-16

## 2025-01-29 ENCOUNTER — RX RENEWAL (OUTPATIENT)
Age: 65
End: 2025-01-29

## 2025-02-05 ENCOUNTER — APPOINTMENT (OUTPATIENT)
Dept: CARDIOLOGY | Facility: CLINIC | Age: 65
End: 2025-02-05

## 2025-02-06 ENCOUNTER — APPOINTMENT (OUTPATIENT)
Dept: UROGYNECOLOGY | Facility: CLINIC | Age: 65
End: 2025-02-06
Payer: MEDICARE

## 2025-02-06 DIAGNOSIS — N81.11 CYSTOCELE, MIDLINE: ICD-10-CM

## 2025-02-06 PROCEDURE — G2211 COMPLEX E/M VISIT ADD ON: CPT

## 2025-02-06 PROCEDURE — 99213 OFFICE O/P EST LOW 20 MIN: CPT

## 2025-02-19 ENCOUNTER — RX RENEWAL (OUTPATIENT)
Age: 65
End: 2025-02-19

## 2025-04-24 ENCOUNTER — APPOINTMENT (OUTPATIENT)
Dept: UROGYNECOLOGY | Facility: CLINIC | Age: 65
End: 2025-04-24

## 2025-04-24 VITALS
DIASTOLIC BLOOD PRESSURE: 81 MMHG | WEIGHT: 215 LBS | HEIGHT: 62 IN | HEART RATE: 64 BPM | BODY MASS INDEX: 39.56 KG/M2 | SYSTOLIC BLOOD PRESSURE: 164 MMHG

## 2025-04-24 DIAGNOSIS — N81.11 CYSTOCELE, MIDLINE: ICD-10-CM

## 2025-04-24 PROCEDURE — 99459 PELVIC EXAMINATION: CPT

## 2025-04-24 PROCEDURE — G2211 COMPLEX E/M VISIT ADD ON: CPT

## 2025-04-24 PROCEDURE — 99213 OFFICE O/P EST LOW 20 MIN: CPT

## 2025-05-08 ENCOUNTER — RX RENEWAL (OUTPATIENT)
Age: 65
End: 2025-05-08

## 2025-07-22 ENCOUNTER — APPOINTMENT (OUTPATIENT)
Dept: UROGYNECOLOGY | Facility: CLINIC | Age: 65
End: 2025-07-22
Payer: MEDICARE

## 2025-07-22 DIAGNOSIS — N81.11 CYSTOCELE, MIDLINE: ICD-10-CM

## 2025-07-22 PROCEDURE — 99213 OFFICE O/P EST LOW 20 MIN: CPT

## 2025-07-22 PROCEDURE — G2211 COMPLEX E/M VISIT ADD ON: CPT
